# Patient Record
Sex: FEMALE | URBAN - METROPOLITAN AREA
[De-identification: names, ages, dates, MRNs, and addresses within clinical notes are randomized per-mention and may not be internally consistent; named-entity substitution may affect disease eponyms.]

---

## 2019-03-16 ENCOUNTER — EMERGENCY (EMERGENCY)
Facility: HOSPITAL | Age: 59
LOS: 1 days | Discharge: HOME | End: 2019-03-16
Admitting: EMERGENCY MEDICINE
Payer: SELF-PAY

## 2019-03-16 VITALS
WEIGHT: 177.91 LBS | HEART RATE: 66 BPM | SYSTOLIC BLOOD PRESSURE: 128 MMHG | OXYGEN SATURATION: 100 % | TEMPERATURE: 98 F | DIASTOLIC BLOOD PRESSURE: 87 MMHG | RESPIRATION RATE: 16 BRPM

## 2019-03-16 DIAGNOSIS — Z88.2 ALLERGY STATUS TO SULFONAMIDES: ICD-10-CM

## 2019-03-16 DIAGNOSIS — R07.81 PLEURODYNIA: ICD-10-CM

## 2019-03-16 PROCEDURE — 99282 EMERGENCY DEPT VISIT SF MDM: CPT

## 2019-03-16 RX ORDER — ACETAMINOPHEN 500 MG
650 TABLET ORAL ONCE
Qty: 0 | Refills: 0 | Status: COMPLETED | OUTPATIENT
Start: 2019-03-16 | End: 2019-03-16

## 2019-03-16 NOTE — ED PROVIDER NOTE - CLINICAL SUMMARY MEDICAL DECISION MAKING FREE TEXT BOX
The patient appears to have eloped from the ED after initial assessment and waiting for xrays.  Entire ED and waiting area searched by staff.  Patient not found.  Will have SW attempt to contact patient.

## 2019-03-16 NOTE — ED PROVIDER NOTE - OBJECTIVE STATEMENT
57 yo F with PMHx of 59 yo F with PMHx of, presenting c/o L sided rib pain s/p pilates class today. Pt was stretching in class and heard a crack on the L side and now with persistent pain. Pain is worse with movements and deep breathing.  Denies fever, chills, wheezing, hemoptysis, CP, direct trauma, fall, rash, redness, swelling, focal weakness, N/V/D/C, abdominal pain, back pain and malaise.

## 2019-03-16 NOTE — ED PROVIDER NOTE - PHYSICAL EXAMINATION
Vital Signs - nursing notes reviewed and confirmed  Gen - WDWN F, NAD, comfortable and non-toxic appearing, speaking in full sentences   Skin - warm, dry, intact  HEENT - AT/NC, PERRL, EOMI, uvula midline, airway patent, neck supple and NT, FROM  CV - S1S2, R/R/R  Resp - respiration non-labored, CTAB, symmetric bs b/l, no r/r/w  GI - NABS, soft, ND, NT, no rebound or guarding, no CVAT b/l   MS - w/w/p, no c/c/e, calves supple and NT, distal pulses symmetric b/l, brisk cap refills, +SILT  Neuro - AxOx3, no focal neuro deficits, CN II-XII grossly intact, ambulatory without gait disturbance Vital Signs - nursing notes reviewed and confirmed  Gen - WDWN F, NAD, comfortable and non-toxic appearing, speaking in full sentences   Skin - warm, dry, intact  HEENT - AT/NC, PERRL, EOMI, uvula midline, airway patent, neck supple and NT, FROM  CV - S1S2, R/R/R, L sided lateral rib cage TTP with no edema, erythema, ecchymosis, crepitus or rash   Resp - respiration non-labored, CTAB, symmetric bs b/l, no r/r/w  GI - NABS, soft, ND, NT, no rebound or guarding, no CVAT b/l   MS - w/w/p, no c/c/e, calves supple and NT, distal pulses symmetric b/l, brisk cap refills, +SILT  Neuro - AxOx3, no focal neuro deficits, CN II-XII grossly intact, ambulatory without gait disturbance

## 2019-06-28 NOTE — ED PROVIDER NOTE - NS ED ATTENDING NAME FT
FAXED WRITTEN ORDERS FOR DIABETES TESTING SUPPLIES TO [facility].    Karla Vargas, CMA     Karl Contreras

## 2020-03-18 PROBLEM — Z00.00 ENCOUNTER FOR PREVENTIVE HEALTH EXAMINATION: Status: ACTIVE | Noted: 2020-03-18

## 2020-03-20 ENCOUNTER — APPOINTMENT (OUTPATIENT)
Dept: ULTRASOUND IMAGING | Facility: CLINIC | Age: 60
End: 2020-03-20

## 2020-03-20 ENCOUNTER — APPOINTMENT (OUTPATIENT)
Dept: MAMMOGRAPHY | Facility: CLINIC | Age: 60
End: 2020-03-20

## 2023-02-08 NOTE — ED PROVIDER NOTE - NS HIV RISK FACTOR YES
Anesthesia Evaluation     no history of anesthetic complications:               Airway   Mallampati: I  TM distance: >3 FB  Neck ROM: full  Dental - normal exam     Pulmonary    (+) pneumonia , sleep apnea,   (-) shortness of breath, recent URI, not a smoker  Cardiovascular     (+) hypertension, dysrhythmias Atrial Fib, hyperlipidemia,       Neuro/Psych  (-) dizziness/light headedness, syncope  GI/Hepatic/Renal/Endo    (-) liver disease, no renal disease    Musculoskeletal     Abdominal    Substance History      OB/GYN          Other                      Anesthesia Plan    ASA 3     MAC     intravenous induction     Anesthetic plan, risks, benefits, and alternatives have been provided, discussed and informed consent has been obtained with: patient.        CODE STATUS:        Declined

## 2023-03-28 ENCOUNTER — APPOINTMENT (OUTPATIENT)
Dept: ORTHOPEDIC SURGERY | Facility: CLINIC | Age: 63
End: 2023-03-28

## 2024-12-06 NOTE — ED ADULT NURSE NOTE - NS ED NURSE ELOPE DATE TIME
Pre-Operative Diagnosis: ESOPHAGEAL DYSPHAGIA; WEIGHT LOSS     Post-Operative Diagnosis: Schatzkis ring, hiatal hernia, gastritis     Procedure Performed:   ESOPHAGOGASTRODUODENOSCOPY (EGD) with biopsies and balloon dilation to 20mm    Surgeons and Role:     * Yaya Cook MD - Primary    Assistant(s):        Surgical Findings: see above     Specimen: see above     Estimated Blood Loss: No data recorded    Dictation Number:  none    Yaya Cook MD  12/6/2024  10:31 AM           16-Mar-2019 10:11